# Patient Record
Sex: FEMALE | Race: WHITE | NOT HISPANIC OR LATINO | Employment: OTHER | URBAN - METROPOLITAN AREA
[De-identification: names, ages, dates, MRNs, and addresses within clinical notes are randomized per-mention and may not be internally consistent; named-entity substitution may affect disease eponyms.]

---

## 2017-08-17 ENCOUNTER — HISTORICAL (OUTPATIENT)
Dept: SURGERY | Facility: HOSPITAL | Age: 82
End: 2017-08-17

## 2017-08-21 ENCOUNTER — HISTORICAL (OUTPATIENT)
Dept: ANESTHESIOLOGY | Facility: HOSPITAL | Age: 82
End: 2017-08-21

## 2017-10-02 ENCOUNTER — HISTORICAL (OUTPATIENT)
Dept: RADIOLOGY | Facility: HOSPITAL | Age: 82
End: 2017-10-02

## 2017-10-04 ENCOUNTER — HISTORICAL (OUTPATIENT)
Dept: RADIOLOGY | Facility: HOSPITAL | Age: 82
End: 2017-10-04

## 2017-12-04 ENCOUNTER — HISTORICAL (OUTPATIENT)
Dept: ANESTHESIOLOGY | Facility: HOSPITAL | Age: 82
End: 2017-12-04

## 2018-07-27 ENCOUNTER — HISTORICAL (OUTPATIENT)
Dept: RADIOLOGY | Facility: HOSPITAL | Age: 83
End: 2018-07-27

## 2019-10-03 ENCOUNTER — HISTORICAL (OUTPATIENT)
Dept: RADIOLOGY | Facility: HOSPITAL | Age: 84
End: 2019-10-03

## 2019-10-03 ENCOUNTER — HISTORICAL (OUTPATIENT)
Dept: LAB | Facility: HOSPITAL | Age: 84
End: 2019-10-03

## 2019-10-03 LAB
ABS NEUT (OLG): 3.7 X10(3)/MCL (ref 1.5–6.9)
ALBUMIN SERPL-MCNC: 3.7 GM/DL (ref 3.4–5)
ALBUMIN/GLOB SERPL: 0.9 RATIO
ALP SERPL-CCNC: 52 UNIT/L (ref 30–113)
ALT SERPL-CCNC: 16 UNIT/L (ref 10–45)
AST SERPL-CCNC: 17 UNIT/L (ref 15–37)
BASOPHILS # BLD AUTO: 0.1 X10(3)/MCL (ref 0–0.1)
BASOPHILS NFR BLD AUTO: 1 % (ref 0–1)
BILIRUB SERPL-MCNC: 0.4 MG/DL (ref 0.1–0.9)
BILIRUBIN DIRECT+TOT PNL SERPL-MCNC: 0.1 MG/DL (ref 0–0.3)
BILIRUBIN DIRECT+TOT PNL SERPL-MCNC: 0.3 MG/DL
BUN SERPL-MCNC: 18 MG/DL (ref 10–20)
CALCIUM SERPL-MCNC: 9.5 MG/DL (ref 8–10.5)
CHLORIDE SERPL-SCNC: 102 MMOL/L (ref 100–108)
CHOLEST SERPL-MCNC: 211 MG/DL (ref 140–200)
CHOLEST/HDLC SERPL: 3 MG/DL (ref 0–8)
CO2 SERPL-SCNC: 32 MMOL/L (ref 21–35)
CREAT SERPL-MCNC: 0.77 MG/DL (ref 0.7–1.3)
EOSINOPHIL # BLD AUTO: 0.2 X10(3)/MCL (ref 0–0.6)
EOSINOPHIL NFR BLD AUTO: 3 % (ref 0–5)
ERYTHROCYTE [DISTWIDTH] IN BLOOD BY AUTOMATED COUNT: 14.3 % (ref 11.5–17)
GLOBULIN SER-MCNC: 3.9 GM/DL
GLUCOSE SERPL-MCNC: 108 MG/DL (ref 75–116)
HCT VFR BLD AUTO: 43.8 % (ref 36–48)
HDLC SERPL-MCNC: 64 MG/DL (ref 35–59)
HGB BLD-MCNC: 14 GM/DL (ref 12–16)
IMM GRANULOCYTES # BLD AUTO: 0.01 10*3/UL (ref 0–0.02)
IMM GRANULOCYTES NFR BLD AUTO: 0.1 % (ref 0–0.43)
LDLC SERPL CALC-MCNC: 126 MG/DL (ref 100–129)
LYMPHOCYTES # BLD AUTO: 2.2 X10(3)/MCL (ref 0.5–4.1)
LYMPHOCYTES NFR BLD AUTO: 31 % (ref 15–40)
MCH RBC QN AUTO: 29 PG (ref 27–34)
MCHC RBC AUTO-ENTMCNC: 32 GM/DL (ref 31–36)
MCV RBC AUTO: 91 FL (ref 80–99)
MONOCYTES # BLD AUTO: 0.8 X10(3)/MCL (ref 0–1.1)
MONOCYTES NFR BLD AUTO: 12 % (ref 4–12)
NEUTROPHILS # BLD AUTO: 3.7 X10(3)/MCL (ref 1.5–6.9)
NEUTROPHILS NFR BLD AUTO: 53 % (ref 43–75)
PLATELET # BLD AUTO: 222 X10(3)/MCL (ref 140–400)
PMV BLD AUTO: 10 FL (ref 6.8–10)
POTASSIUM SERPL-SCNC: 3.9 MMOL/L (ref 3.6–5.2)
PROT SERPL-MCNC: 7.6 GM/DL (ref 6.4–8.2)
RBC # BLD AUTO: 4.82 X10(6)/MCL (ref 4.2–5.4)
SODIUM SERPL-SCNC: 139 MMOL/L (ref 135–145)
TRIGL SERPL-MCNC: 101 MG/DL (ref 35–150)
TSH SERPL-ACNC: 1.51 MIU/ML (ref 0.36–3.74)
VLDLC SERPL CALC-MCNC: 20 MG/DL
WBC # SPEC AUTO: 7 X10(3)/MCL (ref 4.5–11.5)

## 2020-03-12 ENCOUNTER — HOSPITAL ENCOUNTER (OUTPATIENT)
Dept: MEDSURG UNIT | Facility: HOSPITAL | Age: 85
End: 2020-03-13
Attending: FAMILY MEDICINE | Admitting: FAMILY MEDICINE

## 2020-06-13 ENCOUNTER — HISTORICAL (OUTPATIENT)
Dept: RADIOLOGY | Facility: HOSPITAL | Age: 85
End: 2020-06-13

## 2021-02-10 ENCOUNTER — HISTORICAL (OUTPATIENT)
Dept: RADIOLOGY | Facility: HOSPITAL | Age: 86
End: 2021-02-10

## 2022-02-11 ENCOUNTER — HISTORICAL (OUTPATIENT)
Dept: RADIOLOGY | Facility: HOSPITAL | Age: 87
End: 2022-02-11

## 2022-02-11 ENCOUNTER — HISTORICAL (OUTPATIENT)
Dept: ADMINISTRATIVE | Facility: HOSPITAL | Age: 87
End: 2022-02-11

## 2022-04-29 NOTE — DISCHARGE SUMMARY
REASONS FOR ADMISSION:  The patient was admitted for diverticulitis, hypertension, weakness, family dynamics problems and anxiety.    DISCHARGE DIAGNOSES:  Diverticulitis, hypertension, weakness, family dynamics problems and anxiety.    HISTORY AND HOSPITAL COURSE:  She does have a strong history of diverticulitis, for which she has been admitted before.  She had presented to the emergency room earlier in the morning and was sent home on Cipro and Flagyl.  She was supposed to be going to [place] with her daughter to move there.  She __________ a lot of pain.  She also was having some anxiety.  They placed her under observation status overnight.  By the time I saw her, her pain was much better.  She is on Cipro and Flagyl IV.  She was having very little pain.  Her vital signs were stable.  Her daughter was there.  They want to move her to [place] for better care.  The patient wants to stay at home here in the [facility] area.  The patient and family are aware that she is anxious about this news.  Her left lower quadrant __________ and it was decided that she was stable for discharge to go home on Cipro and Flagyl orally with a pain script for Norco.  The patient is to return back to clinic in 1-2 weeks, but the patient is going to be going with her daughter to the [place] area and she may find followup there.        FREDIS/EMMETT   DD: 03/14/2020 0618   DT: 03/14/2020 0643  Job # [id number]

## 2022-04-29 NOTE — ED PROVIDER NOTES
"   Patient:   Adeline Squires            MRN: [id number]            FIN: [id number]               Age:   88 years     Sex:  Female     :  10/11/1931   Associated Diagnoses:   Acute weakness; Diverticulitis; Family dynamics problem   Author:   Ronnie Shankar MD      Basic Information   Time seen: Date & time 3/13/2020 23:46:00.   History source: Patient, daughter, Candida.   Arrival mode: Ambulance.   History limitation: None.   Additional information: Patient's physician(s): Pawan BONILLA, Patricia Guerrero, PCP, Chief Complaint from Nursing Triage Note : Chief Complaint   3/12/2020 23:35 CDT      Chief Complaint           Acute onset of weakness.  Pt upset because "I feel like I'm such a bother to my family because I can't take care of my self anymore".    3/12/2020 8:31 CDT       Chief Complaint           abd pain x2 days after eating at [employer],  pt reports Hx of diverticulitis,  deneis fever or NVD,  last BM tuesday  .   Provider/Visit info:   Time Seen:  Ronnie Shankar MD / 2020 23:46  .   History of Present Illness   The patient presents with weakness.  The onset was just prior to arrival.  The course/duration of symptoms is improving.  The character of symptoms is generalized.  The degree at present is none.  Risk factors consist of hypertension, age and HLD .  Prior episodes: none.  Therapy today: see nurses notes.  Associated symptoms: nausea, denies chest pain, denies abdominal pain, denies vomiting, denies shortness of breath, denies fever, denies chills, denies headache, denies dizziness and denies back pain.        Review of Systems   Constitutional symptoms:  Weakness, no fever, no chills, no sweats, no fatigue, no decreased activity.    Skin symptoms:  No rash,    Eye symptoms:  No recent vision problems, no blurred vision.    Respiratory symptoms:  No shortness of breath, no cough, no sputum production.    Cardiovascular symptoms:  No chest pain, no tachycardia, no syncope, no peripheral edema.  "   Gastrointestinal symptoms:  Nausea, no abdominal pain, no vomiting.    Musculoskeletal symptoms:  No back pain, no Muscle pain, no Joint pain.    Neurologic symptoms:  No headache, no dizziness, no altered level of consciousness, no numbness, no tingling, no weakness.    Psychiatric symptoms:  Anxiety.             Additional review of systems information: All other systems reviewed and otherwise negative.      Health Status   Allergies:    Allergic Reactions (Selected)  No Known Allergies,    Allergies (1) Active Reaction  No Known Allergies None Documented  .   Medications: Per nurse's notes.      Past Medical/ Family/ Social History   Medical history:    Active  Hypertension (67066936)  H/O: dementia ([id number])  Diverticulitis ([id number])  Hypothyroidism (68860406), Reviewed as documented in chart.   Surgical history:    Cataract Extract Phacoemulsification/IOL (Right) on 12/4/2017 at 86 Years.  Comments:  12/4/2017 7:46 Kristen Claudio RN  auto-populated from documented surgical case  Cataract Extract Phacoemulsification/IOL (Left) on 8/21/2017 at 85 Years.  Comments:  8/21/2017 11:30 Kristen Hunter RN  auto-populated from documented surgical case  Tonsillectomy and adenoidectomy ([id number]).  Breast biopsy and related procedures ([id number]).  Colonoscopy ([id number])., Reviewed as documented in chart.   Family history:    Congestive heart disease.  Father  Mother  , Reviewed as documented in chart.   Social history:    Social & Psychosocial Habits    Alcohol  08/17/2017  Use: Never    Employment/School  08/17/2017  Status: Retired    Home/Environment  08/17/2017  Lives with: Alone    Substance Use  08/17/2017  Use: Never    Tobacco  06/11/2019  Use: Never (less than 100 in l    Patient Wants Consult For Cessation Counseling No    03/12/2020  Use: Never (less than 100 in l    Patient Wants Consult For Cessation Counseling No    Abuse/Neglect  06/11/2019  SHX Any signs of abuse or neglect No    Feels  unsafe at home: No    Safe place to go: Yes    03/12/2020  SHX Any signs of abuse or neglect No  , Reviewed as documented in chart.   Problem list:    Active Problems (6)  Diverticulitis   H/O: dementia   Hypertension   Hypothyroidism   Insomnia   Thyroid dysfunction   .      Physical Examination               Vital Signs   Vital Signs   3/12/2020 23:35 CDT      Temperature Oral          37.7 DegC                             Temperature Oral (calculated)             99.86 DegF                             Peripheral Pulse Rate     63 bpm                             Respiratory Rate          24 br/min                             SpO2                      98 %                             Oxygen Therapy            Room air                             Systolic Blood Pressure   162 mmHg  HI                             Diastolic Blood Pressure  98 mmHg  HI  .   General:  Alert, no acute distress.    Skin:  Normal for ethnicity.   Head:  Normocephalic.   Neck:  Supple, trachea midline, no tenderness.    Eye:  Normal conjunctiva.   Ears, nose, mouth and throat:  Oral mucosa moist.   Cardiovascular:  Regular rate and rhythm, Normal peripheral perfusion.    Respiratory:  Lungs are clear to auscultation, respirations are non-labored, breath sounds are equal.    Gastrointestinal:  Soft, Nontender, Non distended, Normal bowel sounds.    Musculoskeletal:  Normal ROM, normal strength, no tenderness, no swelling, no deformity.    Neurological:  Alert and oriented to person, place, time, and situation, No focal neurological deficit observed, normal sensory observed, normal motor observed, normal speech observed, normal coordination observed.    Psychiatric:  Cooperative.      Medical Decision Making   Documents reviewed:  Emergency department nurses' notes, emergency department records, prior records.    Results review:  Lab results : Lab View   3/13/2020 0:50 CDT       UA Appear                 Clear                             UA  Color                  Yellow                             UA Spec Grav              1.020  NA                             UA Bili                   Negative                             UA pH                     7.5  NA                             UA Urobilinogen           0.2 EU/dL                             UA Blood                  Trace-Intact                             UA Glucose                Negative                             UA Ketones                15 mg/dL                             UA Protein                Negative                             UA Nitrite                Negative                             UA Leuk Est               Negative                             UA WBC                    None Seen                             UA RBC                    Rare /HPF                             UA Bacteria               None Seen /HPF                             UA Squam Epithelial       Rare /LPF    3/13/2020 0:48 CDT       Sodium Lvl                138 mmol/L                             Potassium Lvl             3.9 mmol/L                             Chloride                  102 mmol/L                             CO2                       32 mmol/L                             Calcium Lvl               9.2 mg/dL                             Glucose Lvl               142 mg/dL  HI                             BUN                       12 mg/dL                             Creatinine                0.76 mg/dL                             eGFR-AA                   >60 mL/min/1.73 m2  NA                             eGFR-PRIYA                  >60 mL/min/1.73 m2  NA                             Bili Total                0.5 mg/dL                             Bili Direct               0.10 mg/dL                             Bili Indirect             0.40 mg/dL  NA                             AST                       15 unit/L                             ALT                       18 unit/L                              Alk Phos                  51 unit/L                             Total Protein             7.7 gm/dL                             Albumin Lvl               4.1 gm/dL                             Globulin                  3.60 gm/dL  NA                             A/G Ratio                 1.1 ratio  NA                             Total CK                  49 unit/L                             CK MB                     0.9 ng/mL                             Troponin-I                <0.017 ng/mL                             WBC                       6.2 x10(3)/mcL                             RBC                       4.73 x10(6)/mcL                             Hgb                       13.8 gm/dL                             Hct                       42.7 %                             Platelet                  214 x10(3)/mcL                             MCV                       90 fL                             MCH                       29 pg                             MCHC                      32 gm/dL                             RDW                       13.9 %                             MPV                       10.1 fL  HI                             Abs Neut                  4.2 x10(3)/mcL                             Neutro Auto               67 %                             Lymph Auto                21 %                             Mono Auto                 11 %                             Eos Auto                  0 %                             Abs Eos                   0.0 x10(3)/mcL                             Basophil Auto             1 %                             Abs Neutro                4.2 x10(3)/mcL                             Abs Lymph                 1.3 x10(3)/mcL                             Abs Mono                  0.7 x10(3)/mcL                             Abs Baso                  0.0 x10(3)/mcL                             IG%                       0.200 %                             IG#                        0.0100  , Interpretation Normal results.    Notes:  Pt presents with acute weakness. pt seen earlier in ED and diagnosed with diverticulitis. pt also having family issues regarding her liivng situation and is worried about that.  Discussed case with pcp on call service- recommend admission.  .      Reexamination/ Reevaluation   Time: 3/13/2020 01:40:00 .   Vital signs   results included from flowsheet : Vital Signs   3/13/2020 2:23 CDT       Temperature Oral          36.4 DegC                             Temperature Oral (calculated)             97.52 DegF                             Peripheral Pulse Rate     58 bpm  LOW                             SpO2                      95 %                             Systolic Blood Pressure   151 mmHg  HI                             Diastolic Blood Pressure  69 mmHg                             Mean Arterial Pressure, Cuff              96 mmHg    3/13/2020 1:45 CDT       Oxygen Therapy            Room air    3/13/2020 1:38 CDT       Peripheral Pulse Rate     72 bpm                             Respiratory Rate          14 br/min                             SpO2                      98 %                             Systolic Blood Pressure   149 mmHg  HI                             Diastolic Blood Pressure  69 mmHg    3/12/2020 23:45 CDT      Oxygen Therapy            Room air    3/12/2020 23:35 CDT      Temperature Oral          37.7 DegC                             Temperature Oral (calculated)             99.86 DegF                             Peripheral Pulse Rate     63 bpm                             Respiratory Rate          24 br/min                             SpO2                      98 %                             Oxygen Therapy            Room air                             Systolic Blood Pressure   162 mmHg  HI                             Diastolic Blood Pressure  98 mmHg  HI     Course: improving.   Pain status: decreased.   Assessment: Discussed  case with pcp on call service- recommend admisison.      .      Impression and Plan   Diagnosis   Acute weakness (MMF46-GT R53.1)   Diverticulitis (IIH69-JS K57.92)   Family dynamics problem (WEZ21-TO Z63.9)      Calls-Consults   -  3/13/2020 01:29:00 , Pawan BONILLA, Patricia Guerrero, PCP- On call service, recommends Spoke with Dr. Wolf-recommend admission.  .    Plan   Condition: Stable.    Disposition: Place in Observation Telemetry Unit.    Counseled: Patient, Family, Regarding diagnosis, Regarding diagnostic results, Regarding treatment plan, Patient indicated understanding of instructions, Daughter verbalized understanding .

## 2022-04-29 NOTE — OP NOTE
PREOPERATIVE DIAGNOSIS:  Nuclear sclerotic cataract, right eye.    POSTOPERATIVE DIAGNOSIS:  Nuclear sclerotic cataract, right eye.    OPERATIVE PROCEDURE:  Phacoemulsification of cataract, right eye, with insertion of intraocular lens (59357).    PROCEDURE IN DETAIL:  After premedication, the patient was taken to the operating room under local anesthesia and prepped and draped in a sterile manner.  A Barraquer lid speculum was placed in the eye and a 3.2 clear corneal groove was performed at the temporal cornea.  A micropoint blade was then used to perform a keratotomy at the 1:00 hour to the right of the groove.  The anterior chamber was filled with viscoelastic and then a 2.5 millimeter keratome was introduced into the cornea through the previously made groove.  A previously bent 30 gauge needle was used to perform a continuous tear capsulotomy and the nucleus was then hydrodissected.  Emulsification of the nucleus was carried out with a fracturing technique and then the cortex removed with the I-A hand piece.  The posterior capsule was polished with the posterior Simcoe needle and then the capsular bag and anterior chamber refilled with viscoelastic.  The corneal wound was enlarged to 3.2 millimeters and then a foldable lens was placed in the capsular bag and dialed into position.  The viscoelastic was then removed from the anterior and posterior chambers with the I-A handpiece and the anterior chamber was reinflated with balanced salt solution.  The wounds were tested and found to be secure.  The patient tolerated the procedure well and was taken back to the outpatient unit in good condition.        THC/EMMETT   DD: 12/04/2017 0748   DT: 12/04/2017 0815  Job # [id number]

## 2022-04-29 NOTE — OP NOTE
PREOPERATIVE DIAGNOSIS:  Nuclear sclerotic cataract, left eye.    POSTOPERATIVE DIAGNOSIS: Nuclear sclerotic cataract left eye    PROCEDURE:  Phacoemulsification of cataract of left eye with insertion of intraocular lens.    PROCEDURE IN DETAIL:  After premedication, the patient was taken to the operating room under local anesthesia and prepped and draped in a sterile manner.  A Barraquer lid speculum was placed in the eye and a 3.2 clear corneal groove was performed at the temporal cornea.  A micropoint blade was then used to perform a keratotomy at the 1:00 hour to the right of the groove.  The anterior chamber was filled with viscoelastic and then a 2.5 millimeter keratome was introduced into the cornea through the previously made groove.  A previously bent 30 gauge needle was used to perform a continuous tear capsulotomy and the nucleus was then hydrodissected.  Emulsification of the nucleus was carried out with a fracturing technique and then the cortex removed with the I-A hand piece.  The posterior capsule was polished with the posterior Simcoe needle and then the capsular bag and anterior chamber refilled with viscoelastic.  The corneal wound was enlarged to 3.2 millimeters and then a foldable lens was placed in the capsular bag and dialed into position.  The viscoelastic was then removed from the anterior and posterior chambers with the I-A handpiece and the anterior chamber was reinflated with balanced salt solution.  The wounds were tested and found to be secure.  The patient tolerated the procedure well and was taken back to the outpatient unit in good condition.        THC/EMMETT   DD: 09/13/2017 0914   DT: 09/13/2017 0924  Job # [id number]

## 2022-05-17 DIAGNOSIS — I82.592 CHRONIC EMBOLISM AND THROMBOSIS OF OTHER SPECIFIED DEEP VEIN OF LEFT LOWER EXTREMITY: ICD-10-CM

## 2022-05-17 DIAGNOSIS — R09.89 OTHER SPECIFIED SYMPTOMS AND SIGNS INVOLVING THE CIRCULATORY AND RESPIRATORY SYSTEMS: Primary | ICD-10-CM

## 2022-05-17 DIAGNOSIS — I82.402 DEEP VEIN THROMBOSIS OF LEFT LOWER LIMB: ICD-10-CM

## 2022-05-18 ENCOUNTER — HOSPITAL ENCOUNTER (OUTPATIENT)
Dept: RADIOLOGY | Facility: HOSPITAL | Age: 87
Discharge: HOME | End: 2022-05-18
Attending: FAMILY MEDICINE
Payer: MEDICARE

## 2022-05-18 DIAGNOSIS — R09.89 OTHER SPECIFIED SYMPTOMS AND SIGNS INVOLVING THE CIRCULATORY AND RESPIRATORY SYSTEMS: ICD-10-CM

## 2022-05-18 DIAGNOSIS — I82.402 DEEP VEIN THROMBOSIS OF LEFT LOWER LIMB: ICD-10-CM

## 2022-05-18 DIAGNOSIS — I82.592 CHRONIC EMBOLISM AND THROMBOSIS OF OTHER SPECIFIED DEEP VEIN OF LEFT LOWER EXTREMITY: ICD-10-CM

## 2022-05-18 PROCEDURE — 93971 EXTREMITY STUDY: CPT | Mod: TC,LT

## 2022-05-18 PROCEDURE — 93926 LOWER EXTREMITY STUDY: CPT | Mod: TC,LT

## 2022-08-13 ENCOUNTER — HOSPITAL ENCOUNTER (EMERGENCY)
Facility: HOSPITAL | Age: 87
Discharge: HOME | End: 2022-08-13
Attending: INTERNAL MEDICINE
Payer: MEDICARE

## 2022-08-13 VITALS
BODY MASS INDEX: 32.2 KG/M2 | RESPIRATION RATE: 18 BRPM | DIASTOLIC BLOOD PRESSURE: 86 MMHG | TEMPERATURE: 99 F | WEIGHT: 164 LBS | OXYGEN SATURATION: 97 % | HEIGHT: 60 IN | SYSTOLIC BLOOD PRESSURE: 151 MMHG | HEART RATE: 89 BPM

## 2022-08-13 DIAGNOSIS — N39.0 URINARY TRACT INFECTION WITH HEMATURIA, SITE UNSPECIFIED: Primary | ICD-10-CM

## 2022-08-13 DIAGNOSIS — R31.9 URINARY TRACT INFECTION WITH HEMATURIA, SITE UNSPECIFIED: Primary | ICD-10-CM

## 2022-08-13 LAB
ALBUMIN SERPL-MCNC: 3.7 GM/DL (ref 3.4–4.8)
ALBUMIN/GLOB SERPL: 1.1 RATIO (ref 1.1–2)
ALP SERPL-CCNC: 39 UNIT/L (ref 40–150)
ALT SERPL-CCNC: 25 UNIT/L (ref 0–55)
APPEARANCE UR: CLEAR
AST SERPL-CCNC: 30 UNIT/L (ref 5–34)
BACTERIA #/AREA URNS AUTO: ABNORMAL /HPF
BASOPHILS # BLD AUTO: 0.07 X10(3)/MCL (ref 0–0.2)
BASOPHILS NFR BLD AUTO: 0.8 %
BILIRUB UR QL STRIP.AUTO: NEGATIVE MG/DL
BILIRUBIN DIRECT+TOT PNL SERPL-MCNC: 0.3 MG/DL
BUN SERPL-MCNC: 19 MG/DL (ref 9.8–20.1)
CALCIUM SERPL-MCNC: 9.9 MG/DL (ref 8.4–10.2)
CHLORIDE SERPL-SCNC: 99 MMOL/L (ref 98–111)
CO2 SERPL-SCNC: 28 MMOL/L (ref 23–31)
COLOR UR AUTO: YELLOW
CREAT SERPL-MCNC: 0.77 MG/DL (ref 0.55–1.02)
EOSINOPHIL # BLD AUTO: 0.3 X10(3)/MCL (ref 0–0.9)
EOSINOPHIL NFR BLD AUTO: 3.3 %
ERYTHROCYTE [DISTWIDTH] IN BLOOD BY AUTOMATED COUNT: 14.4 % (ref 11.5–17)
ERYTHROCYTE [SEDIMENTATION RATE] IN BLOOD: 25 MM/HR (ref 0–20)
GFR SERPLBLD CREATININE-BSD FMLA CKD-EPI: >60 MLS/MIN/1.73/M2
GLOBULIN SER-MCNC: 3.4 GM/DL (ref 2.4–3.5)
GLUCOSE SERPL-MCNC: 144 MG/DL (ref 75–121)
GLUCOSE UR QL STRIP.AUTO: 100 MG/DL
HCT VFR BLD AUTO: 40.8 % (ref 37–47)
HGB BLD-MCNC: 13.2 GM/DL (ref 12–16)
IMM GRANULOCYTES # BLD AUTO: 0.04 X10(3)/MCL (ref 0–0.04)
IMM GRANULOCYTES NFR BLD AUTO: 0.4 %
KETONES UR QL STRIP.AUTO: NEGATIVE MG/DL
LEUKOCYTE ESTERASE UR QL STRIP.AUTO: ABNORMAL UNIT/L
LYMPHOCYTES # BLD AUTO: 2.58 X10(3)/MCL (ref 0.6–4.6)
LYMPHOCYTES NFR BLD AUTO: 28.5 %
MCH RBC QN AUTO: 28.4 PG (ref 27–31)
MCHC RBC AUTO-ENTMCNC: 32.4 MG/DL (ref 33–36)
MCV RBC AUTO: 87.9 FL (ref 80–94)
MONOCYTES # BLD AUTO: 1.43 X10(3)/MCL (ref 0.1–1.3)
MONOCYTES NFR BLD AUTO: 15.8 %
NEUTROPHILS # BLD AUTO: 4.6 X10(3)/MCL (ref 2.1–9.2)
NEUTROPHILS NFR BLD AUTO: 51.2 %
NITRITE UR QL STRIP.AUTO: NEGATIVE
PH UR STRIP.AUTO: 6.5 [PH]
PLATELET # BLD AUTO: 237 X10(3)/MCL (ref 130–400)
PMV BLD AUTO: 10.9 FL (ref 7.4–10.4)
POTASSIUM SERPL-SCNC: 3.7 MMOL/L (ref 3.5–5.1)
PROT SERPL-MCNC: 7.1 GM/DL (ref 5.8–7.6)
PROT UR QL STRIP.AUTO: NEGATIVE MG/DL
RBC # BLD AUTO: 4.64 X10(6)/MCL (ref 4.2–5.4)
RBC #/AREA URNS AUTO: ABNORMAL /HPF
RBC UR QL AUTO: ABNORMAL UNIT/L
SODIUM SERPL-SCNC: 139 MMOL/L (ref 132–146)
SP GR UR STRIP.AUTO: 1.02
SQUAMOUS #/AREA URNS AUTO: ABNORMAL /HPF
UROBILINOGEN UR STRIP-ACNC: 0.2 MG/DL
WBC # SPEC AUTO: 9.1 X10(3)/MCL (ref 4.5–11.5)
WBC #/AREA URNS AUTO: ABNORMAL /HPF

## 2022-08-13 PROCEDURE — 36415 COLL VENOUS BLD VENIPUNCTURE: CPT | Performed by: NURSE PRACTITIONER

## 2022-08-13 PROCEDURE — 81001 URINALYSIS AUTO W/SCOPE: CPT | Performed by: NURSE PRACTITIONER

## 2022-08-13 PROCEDURE — 99283 EMERGENCY DEPT VISIT LOW MDM: CPT | Mod: 25

## 2022-08-13 PROCEDURE — 85651 RBC SED RATE NONAUTOMATED: CPT | Performed by: NURSE PRACTITIONER

## 2022-08-13 PROCEDURE — 85025 COMPLETE CBC W/AUTO DIFF WBC: CPT | Performed by: NURSE PRACTITIONER

## 2022-08-13 PROCEDURE — 80053 COMPREHEN METABOLIC PANEL: CPT | Performed by: NURSE PRACTITIONER

## 2022-08-13 PROCEDURE — 25000003 PHARM REV CODE 250: Performed by: NURSE PRACTITIONER

## 2022-08-13 RX ORDER — NITROFURANTOIN 25; 75 MG/1; MG/1
100 CAPSULE ORAL 2 TIMES DAILY
Qty: 10 CAPSULE | Refills: 0 | Status: SHIPPED | OUTPATIENT
Start: 2022-08-13 | End: 2022-08-18

## 2022-08-13 RX ORDER — NITROFURANTOIN 25; 75 MG/1; MG/1
100 CAPSULE ORAL
Status: COMPLETED | OUTPATIENT
Start: 2022-08-13 | End: 2022-08-13

## 2022-08-13 RX ORDER — HYDROCODONE BITARTRATE AND ACETAMINOPHEN 5; 325 MG/1; MG/1
1 TABLET ORAL
Status: COMPLETED | OUTPATIENT
Start: 2022-08-13 | End: 2022-08-13

## 2022-08-13 RX ADMIN — NITROFURANTOIN 100 MG: 25; 75 CAPSULE ORAL at 02:08

## 2022-08-13 RX ADMIN — HYDROCODONE BITARTRATE AND ACETAMINOPHEN 1 TABLET: 5; 325 TABLET ORAL at 01:08

## 2022-08-13 NOTE — ED PROVIDER NOTES
Source of History:  Patient      Chief complaint:  Back Pain (L sided low back pain for 4-5 days     PCP saw her Tuesday   thought it was shingles)      HPI:  Adeline Squires is a 89 y.o. female presenting with left mid back pain. She reports the skin itself is very sensitive. She denies injury. She denies fever or chills. She was seen by her pcp and given a pain medication and a muscle relaxer and it is not helping. Nothing helping at this time, any palpation of the skin makes the pain worse.     This is the extent to the patients complaints today here in the emergency department.    ROS: As per HPI and below:  General: No fever.  No chills.  Eyes: No visual changes.  ENT: No sore throat. No ear pain  Head: No headache.    Chest: No shortness of breath. No cough.  Cardiovascular: No chest pain.  Abdomen: No abdominal pain.  No nausea or vomiting.  Genito-Urinary: No abnormal urination.  Neurologic: No focal weakness.  No numbness.  MSK: No myalgias. No arthralgias. Left mid back pain.   Integument: No rashes or lesions. Pain to skin of left mid back.   Psych: No confusion      Review of patient's allergies indicates:  No Known Allergies    PMH:  As per HPI and below:  No past medical history on file.  No past surgical history on file.         Physical Exam:    BP (!) 169/91   Pulse 96   Temp 98.7 °F (37.1 °C) (Oral)   Resp 20   Ht 5' (1.524 m)   Wt 74.4 kg (164 lb)   SpO2 (!) 94%   BMI 32.03 kg/m²   Nursing note and vital signs reviewed.  Appearance: Afebrile. Not toxic appearing. No acute distress.  Head: Atraumatic  Eyes: No conjunctival injection. No scleral icterus  ENT: Normal phonation  Chest/ Respiratory: No respiratory distress. No accessory muscle use.  Cardiovascular: Regular rate   Abdomen:  Not distended.    Musculoskeletal: Good range of motion all joints.  No deformities.  Neck supple.  No meningismus.  Skin: No rashes seen.  Good turgor.  No ecchymoses. Pain to touch of the skin of the mid  left back. No rash. Light erythema.   Neurologic: GCS 15. Ambulates with a steady gait.   Mental Status:  Alert and oriented x 3.  Appropriate, conversant    Labs that have been ordered have been independently reviewed and interpreted by myself.    I decided to obtain the patient's medical records.  Summary of Medical Records:  Nursing documentation    Initial Impression/ Differential Dx:  Muscle spasms, shingles    MDM:    89 y.o. female with pain to the left mid back. On exam her sensitivity to light touch of the skin makes me feel like this is a shingles infection but it has been 4 days and no rash is present. We will check labs and urine today to investigate her c/o of back pain then develop plan of care once these results have been obtained.                    Diagnostic Impression:    1. Urinary tract infection with hematuria, site unspecified         ED Disposition Condition    Discharge Stable          ED Prescriptions     Medication Sig Dispense Start Date End Date Auth. Provider    nitrofurantoin, macrocrystal-monohydrate, (MACROBID) 100 MG capsule Take 1 capsule (100 mg total) by mouth 2 (two) times daily. for 5 days 10 capsule 8/13/2022 8/18/2022 SUKH Bello        Follow-up Information     Follow up With Specialties Details Why Contact Info    Patricia Villalta MD Family Medicine Call in 1 week As needed, For ER Follow Up. [address].  [facility]  [facility] [address]  [phone]             SUKH Bello  08/13/22 3950

## 2022-10-01 ENCOUNTER — HOSPITAL ENCOUNTER (EMERGENCY)
Facility: HOSPITAL | Age: 87
Discharge: HOME | End: 2022-10-01
Attending: EMERGENCY MEDICINE
Payer: MEDICARE

## 2022-10-01 VITALS
OXYGEN SATURATION: 93 % | WEIGHT: 166 LBS | DIASTOLIC BLOOD PRESSURE: 98 MMHG | BODY MASS INDEX: 31.34 KG/M2 | HEART RATE: 87 BPM | RESPIRATION RATE: 18 BRPM | HEIGHT: 61 IN | TEMPERATURE: 98 F | SYSTOLIC BLOOD PRESSURE: 177 MMHG

## 2022-10-01 DIAGNOSIS — N39.0 ACUTE UTI: ICD-10-CM

## 2022-10-01 DIAGNOSIS — M54.50 ACUTE RIGHT-SIDED LOW BACK PAIN WITHOUT SCIATICA: Primary | ICD-10-CM

## 2022-10-01 LAB
APPEARANCE UR: CLEAR
BACTERIA #/AREA URNS AUTO: ABNORMAL /HPF
BILIRUB UR QL STRIP.AUTO: NEGATIVE MG/DL
COLOR UR AUTO: YELLOW
GLUCOSE UR QL STRIP.AUTO: NEGATIVE MG/DL
KETONES UR QL STRIP.AUTO: NEGATIVE MG/DL
LEUKOCYTE ESTERASE UR QL STRIP.AUTO: ABNORMAL UNIT/L
NITRITE UR QL STRIP.AUTO: NEGATIVE
PH UR STRIP.AUTO: 5.5 [PH]
PROT UR QL STRIP.AUTO: NEGATIVE MG/DL
RBC #/AREA URNS AUTO: ABNORMAL /HPF
RBC UR QL AUTO: ABNORMAL UNIT/L
SP GR UR STRIP.AUTO: 1.02
SQUAMOUS #/AREA URNS AUTO: ABNORMAL /HPF
UROBILINOGEN UR STRIP-ACNC: 0.2 MG/DL
WBC #/AREA URNS AUTO: ABNORMAL /HPF

## 2022-10-01 PROCEDURE — 25000003 PHARM REV CODE 250: Performed by: EMERGENCY MEDICINE

## 2022-10-01 PROCEDURE — 87186 SC STD MICRODIL/AGAR DIL: CPT | Performed by: EMERGENCY MEDICINE

## 2022-10-01 PROCEDURE — 99283 EMERGENCY DEPT VISIT LOW MDM: CPT | Mod: 25

## 2022-10-01 PROCEDURE — 81001 URINALYSIS AUTO W/SCOPE: CPT | Performed by: EMERGENCY MEDICINE

## 2022-10-01 RX ORDER — CEFDINIR 300 MG/1
300 CAPSULE ORAL
Status: COMPLETED | OUTPATIENT
Start: 2022-10-01 | End: 2022-10-01

## 2022-10-01 RX ORDER — CEFDINIR 300 MG/1
300 CAPSULE ORAL 2 TIMES DAILY
Qty: 20 CAPSULE | Refills: 0 | Status: SHIPPED | OUTPATIENT
Start: 2022-10-01 | End: 2022-10-11

## 2022-10-01 RX ADMIN — CEFDINIR 300 MG: 300 CAPSULE ORAL at 05:10

## 2022-10-01 NOTE — ED PROVIDER NOTES
Encounter Date: 10/1/2022       History     Chief Complaint   Patient presents with    Back Pain     Pt c/o rt sided lower back pain starting today. Pt states dhe was here a month ago with similar symptoms and was tx for UTI. Denies dysuria.     The history is provided by the patient. No  was used.   Back Pain   This is a new problem. The current episode started today. The problem occurs intermittently. The problem has been waxing and waning. The pain is associated with no known injury. The pain is present in the lumbar spine. The quality of the pain is described as stabbing. The pain does not radiate. Pertinent negatives include no chest pain, no fever, no perianal numbness, no bladder incontinence, no dysuria, no pelvic pain, no leg pain, no paresthesias, no paresis, no tingling and no weakness. She has tried nothing for the symptoms.   Was seen in August for similar symptoms and treated for UTI - symptoms resolved.    Review of patient's allergies indicates:  No Known Allergies  Past Medical History:   Diagnosis Date    Dementia     Diverticulosis of colon     HH (hiatus hernia)     Hypertension     Hypothyroidism     Nephrolithiasis      Past Surgical History:   Procedure Laterality Date    BREAST BIOPSY      CATARACT EXTRACTION      TONSILLECTOMY       Family History   Problem Relation Age of Onset    Heart disease Mother     Heart disease Father      Social History     Tobacco Use    Smoking status: Never    Smokeless tobacco: Never   Substance Use Topics    Alcohol use: Never    Drug use: Never     Review of Systems   Constitutional:  Negative for fever.   HENT:  Negative for sore throat.    Respiratory:  Negative for shortness of breath.    Cardiovascular:  Negative for chest pain.   Gastrointestinal:  Negative for nausea.   Genitourinary:  Negative for bladder incontinence, dysuria and pelvic pain.   Musculoskeletal:  Positive for back pain.   Skin:  Negative for rash.   Neurological:   Negative for tingling, weakness and paresthesias.   Hematological:  Does not bruise/bleed easily.     Physical Exam     Initial Vitals [10/01/22 1705]   BP Pulse Resp Temp SpO2   (!) 177/98 87 18 97.9 °F (36.6 °C) (!) 93 %      MAP       --         Physical Exam    Nursing note and vitals reviewed.  Constitutional: She appears well-developed and well-nourished.   HENT:   Head: Normocephalic and atraumatic.   Right Ear: External ear normal.   Left Ear: External ear normal.   Eyes: Conjunctivae and EOM are normal. Pupils are equal, round, and reactive to light.   Neck: Neck supple.   Normal range of motion.  Cardiovascular:  Normal rate, regular rhythm, normal heart sounds and intact distal pulses.           Pulmonary/Chest: Breath sounds normal.   Abdominal: Abdomen is soft. Bowel sounds are normal.   Musculoskeletal:         General: Normal range of motion.      Cervical back: Normal range of motion and neck supple.        Back:      Neurological: She is alert and oriented to person, place, and time. GCS score is 15. GCS eye subscore is 4. GCS verbal subscore is 5. GCS motor subscore is 6.   Skin: Skin is warm and dry. Capillary refill takes less than 2 seconds.   Psychiatric: She has a normal mood and affect. Her behavior is normal. Judgment and thought content normal.       ED Course   Procedures  Labs Reviewed   URINALYSIS, REFLEX TO URINE CULTURE - Abnormal; Notable for the following components:       Result Value    Blood, UA Trace-Intact (*)     Leukocyte Esterase, UA Small (*)     All other components within normal limits   URINALYSIS, MICROSCOPIC - Abnormal; Notable for the following components:    Bacteria, UA Many (*)     WBC, UA 50-99 (*)     Squamous Epithelial Cells, UA Moderate (*)     All other components within normal limits   CULTURE, URINE          Imaging Results    None          Medications   cefdinir capsule 300 mg (has no administration in time range)                              Clinical  Impression:   Final diagnoses:  [M54.50] Acute right-sided low back pain without sciatica (Primary)  [N39.0] Acute UTI      ED Disposition Condition    Discharge Stable          ED Prescriptions       Medication Sig Dispense Start Date End Date Auth. Provider    cefdinir (OMNICEF) 300 MG capsule Take 1 capsule (300 mg total) by mouth 2 (two) times daily. for 10 days 20 capsule 10/1/2022 10/11/2022 Dane Cerda MD          Follow-up Information       Follow up With Specialties Details Why Contact Info    Follow up with your primary MD in 3-5 days if not improved.  Return to ED for worsening symptoms.                 Dane Cerda MD  10/01/22 [id number]

## 2022-10-04 LAB — BACTERIA UR CULT: ABNORMAL

## 2022-11-05 ENCOUNTER — HOSPITAL ENCOUNTER (EMERGENCY)
Facility: HOSPITAL | Age: 87
Discharge: HOME | End: 2022-11-05
Attending: STUDENT IN AN ORGANIZED HEALTH CARE EDUCATION/TRAINING PROGRAM
Payer: MEDICARE

## 2022-11-05 VITALS
DIASTOLIC BLOOD PRESSURE: 78 MMHG | WEIGHT: 160 LBS | HEART RATE: 76 BPM | TEMPERATURE: 97 F | BODY MASS INDEX: 30.23 KG/M2 | SYSTOLIC BLOOD PRESSURE: 154 MMHG | OXYGEN SATURATION: 93 % | RESPIRATION RATE: 18 BRPM

## 2022-11-05 DIAGNOSIS — B02.9 HERPES ZOSTER WITHOUT COMPLICATION: Primary | ICD-10-CM

## 2022-11-05 LAB
ALBUMIN SERPL-MCNC: 3.7 GM/DL (ref 3.4–4.8)
ALBUMIN/GLOB SERPL: 1.1 RATIO (ref 1.1–2)
ALP SERPL-CCNC: 39 UNIT/L (ref 40–150)
ALT SERPL-CCNC: 21 UNIT/L (ref 0–55)
APPEARANCE UR: ABNORMAL
AST SERPL-CCNC: 22 UNIT/L (ref 5–34)
BACTERIA #/AREA URNS AUTO: ABNORMAL /HPF
BASOPHILS # BLD AUTO: 0.1 X10(3)/MCL (ref 0–0.2)
BASOPHILS NFR BLD AUTO: 1.1 %
BILIRUB UR QL STRIP.AUTO: NEGATIVE MG/DL
BILIRUBIN DIRECT+TOT PNL SERPL-MCNC: 0.3 MG/DL
BUN SERPL-MCNC: 20 MG/DL (ref 9.8–20.1)
CALCIUM SERPL-MCNC: 10 MG/DL (ref 8.4–10.2)
CHLORIDE SERPL-SCNC: 101 MMOL/L (ref 98–111)
CO2 SERPL-SCNC: 26 MMOL/L (ref 23–31)
COLOR UR AUTO: YELLOW
CREAT SERPL-MCNC: 0.8 MG/DL (ref 0.55–1.02)
EOSINOPHIL # BLD AUTO: 0.1 X10(3)/MCL (ref 0–0.9)
EOSINOPHIL NFR BLD AUTO: 1.1 %
ERYTHROCYTE [DISTWIDTH] IN BLOOD BY AUTOMATED COUNT: 15 % (ref 11.5–17)
GFR SERPLBLD CREATININE-BSD FMLA CKD-EPI: >60 MLS/MIN/1.73/M2
GLOBULIN SER-MCNC: 3.4 GM/DL (ref 2.4–3.5)
GLUCOSE SERPL-MCNC: 181 MG/DL (ref 75–121)
GLUCOSE UR QL STRIP.AUTO: NEGATIVE MG/DL
HCT VFR BLD AUTO: 40.5 % (ref 37–47)
HGB BLD-MCNC: 13.5 GM/DL (ref 12–16)
IMM GRANULOCYTES # BLD AUTO: 0.03 X10(3)/MCL (ref 0–0.04)
IMM GRANULOCYTES NFR BLD AUTO: 0.3 %
KETONES UR QL STRIP.AUTO: NEGATIVE MG/DL
LEUKOCYTE ESTERASE UR QL STRIP.AUTO: NEGATIVE UNIT/L
LYMPHOCYTES # BLD AUTO: 3.02 X10(3)/MCL (ref 0.6–4.6)
LYMPHOCYTES NFR BLD AUTO: 32.2 %
MCH RBC QN AUTO: 28.8 PG (ref 27–31)
MCHC RBC AUTO-ENTMCNC: 33.3 MG/DL (ref 33–36)
MCV RBC AUTO: 86.5 FL (ref 80–94)
MONOCYTES # BLD AUTO: 1.19 X10(3)/MCL (ref 0.1–1.3)
MONOCYTES NFR BLD AUTO: 12.7 %
NEUTROPHILS # BLD AUTO: 4.9 X10(3)/MCL (ref 2.1–9.2)
NEUTROPHILS NFR BLD AUTO: 52.6 %
NITRITE UR QL STRIP.AUTO: NEGATIVE
PH UR STRIP.AUTO: 5.5 [PH]
PLATELET # BLD AUTO: 236 X10(3)/MCL (ref 130–400)
PMV BLD AUTO: 11 FL (ref 7.4–10.4)
POTASSIUM SERPL-SCNC: 3.3 MMOL/L (ref 3.5–5.1)
PROT SERPL-MCNC: 7.1 GM/DL (ref 5.8–7.6)
PROT UR QL STRIP.AUTO: NEGATIVE MG/DL
RBC # BLD AUTO: 4.68 X10(6)/MCL (ref 4.2–5.4)
RBC #/AREA URNS AUTO: ABNORMAL /HPF
RBC UR QL AUTO: ABNORMAL UNIT/L
SODIUM SERPL-SCNC: 139 MMOL/L (ref 132–146)
SP GR UR STRIP.AUTO: >=1.03
SQUAMOUS #/AREA URNS AUTO: ABNORMAL /HPF
UROBILINOGEN UR STRIP-ACNC: 0.2 MG/DL
WBC # SPEC AUTO: 9.4 X10(3)/MCL (ref 4.5–11.5)
WBC #/AREA URNS AUTO: ABNORMAL /HPF

## 2022-11-05 PROCEDURE — 80053 COMPREHEN METABOLIC PANEL: CPT | Performed by: NURSE PRACTITIONER

## 2022-11-05 PROCEDURE — 81001 URINALYSIS AUTO W/SCOPE: CPT | Performed by: NURSE PRACTITIONER

## 2022-11-05 PROCEDURE — 81003 URINALYSIS AUTO W/O SCOPE: CPT | Performed by: NURSE PRACTITIONER

## 2022-11-05 PROCEDURE — 99283 EMERGENCY DEPT VISIT LOW MDM: CPT | Mod: 25

## 2022-11-05 PROCEDURE — 85025 COMPLETE CBC W/AUTO DIFF WBC: CPT | Performed by: NURSE PRACTITIONER

## 2022-11-05 RX ORDER — ALENDRONATE SODIUM 35 MG/1
35 TABLET ORAL
COMMUNITY
Start: 2022-10-24

## 2022-11-05 RX ORDER — LIDOCAINE HYDROCHLORIDE 20 MG/ML
5 SOLUTION OROPHARYNGEAL
Status: DISCONTINUED | OUTPATIENT
Start: 2022-11-05 | End: 2022-11-05

## 2022-11-05 RX ORDER — BENAZEPRIL HYDROCHLORIDE 10 MG/1
10 TABLET ORAL DAILY
COMMUNITY
Start: 2022-09-27

## 2022-11-05 RX ORDER — AMLODIPINE BESYLATE 5 MG/1
5 TABLET ORAL DAILY
COMMUNITY
Start: 2022-10-10

## 2022-11-05 RX ORDER — PANTOPRAZOLE SODIUM 40 MG/1
40 TABLET, DELAYED RELEASE ORAL DAILY
COMMUNITY
Start: 2022-10-10

## 2022-11-05 RX ORDER — LEVOTHYROXINE SODIUM 25 UG/1
25 TABLET ORAL DAILY
COMMUNITY
Start: 2022-10-10

## 2022-11-05 RX ORDER — LIDOCAINE 50 MG/G
1 PATCH TOPICAL DAILY
Qty: 15 PATCH | Refills: 0 | Status: SHIPPED | OUTPATIENT
Start: 2022-11-05 | End: 2022-11-20

## 2022-11-05 NOTE — ED PROVIDER NOTES
Encounter Date: 11/5/2022       History     Chief Complaint   Patient presents with    Back Pain     C/o left sided back pain since yesterday. Pt's caregiver states that this often happens when she has a UTI but pt completed meds for a UTI this past Thursday.      The patient is a 89 year old female with history of dementia, diverticulosis, htn, hypothyroidism, nephrolithiasis, and recent recurrent UTI presents to the ER today for left sided flank pain.  Her caregiver provided her history, states that the patient very recently finished a round of doxycycline for UTI, states that when she has a UTI the symptoms present in the same fashion, left flank pain.  Denies any nausea, vomiting, sob, or chest pain.  Denies any fever.  Hypertensive in triage.  Culture report reviewed from 10/1 resistant to bactrim and quinolones.      Review of patient's allergies indicates:  No Known Allergies  Past Medical History:   Diagnosis Date    Dementia     Diverticulosis of colon     HH (hiatus hernia)     Hypertension     Hypothyroidism     Nephrolithiasis      Past Surgical History:   Procedure Laterality Date    BREAST BIOPSY      CATARACT EXTRACTION      TONSILLECTOMY       Family History   Problem Relation Age of Onset    Heart disease Mother     Heart disease Father      Social History     Tobacco Use    Smoking status: Never    Smokeless tobacco: Never   Substance Use Topics    Alcohol use: Never    Drug use: Never     Review of Systems   All other systems reviewed and are negative.    Physical Exam     Initial Vitals [11/05/22 1813]   BP Pulse Resp Temp SpO2   (!) 184/82 89 18 97 °F (36.1 °C) (!) 93 %      MAP       --         Physical Exam    Nursing note and vitals reviewed.  Constitutional: She appears well-developed and well-nourished. She is cooperative.   Ambulates with a walker   Eyes: Conjunctivae and EOM are normal.   Neck: Neck supple.   Normal range of motion.  Cardiovascular:  Normal rate and regular rhythm.            Pulmonary/Chest: Breath sounds normal.   Abdominal: Abdomen is soft. Bowel sounds are normal.   No tenderness, benign examination   Musculoskeletal:        Arms:       Cervical back: Normal range of motion and neck supple.      Comments: Severe pain to palpation left mid thoracic region, no palpable muscle spasm, no rash, consistent with shingles     Neurological: She is alert. She has normal strength.   Skin: Skin is warm and dry.       ED Course   Procedures  Labs Reviewed   URINALYSIS, REFLEX TO URINE CULTURE - Abnormal; Notable for the following components:       Result Value    Appearance, UA Cloudy (*)     Blood, UA Trace-Lysed (*)     All other components within normal limits   COMPREHENSIVE METABOLIC PANEL - Abnormal; Notable for the following components:    Potassium Level 3.3 (*)     Glucose Level 181 (*)     Alkaline Phosphatase 39 (*)     All other components within normal limits   CBC WITH DIFFERENTIAL - Abnormal; Notable for the following components:    MPV 11.0 (*)     All other components within normal limits   URINALYSIS, MICROSCOPIC - Abnormal; Notable for the following components:    Squamous Epithelial Cells, UA Few (*)     All other components within normal limits   CBC W/ AUTO DIFFERENTIAL    Narrative:     The following orders were created for panel order CBC auto differential.  Procedure                               Abnormality         Status                     ---------                               -----------         ------                     CBC with Differential[[id number]]        Abnormal            Final result                 Please view results for these tests on the individual orders.          Imaging Results    None          Medications - No data to display              ED Course as of 11/05/22 2005   Sat Nov 05, 2022 1953 Discussed case with Dr. Coe, he is personally examining the patient.  Her labs are essentially unremarkable, glucose is elevated at 181, potassium is  3.3, trace hematuria, WBC WNL, vital signs at her baseline, NAD [EB]   1957 Dr. Coe agrees with diagnosis of shingles, will treat with lildocaine patch [EB]      ED Course User Index  [EB] SUKH Tinsley                 Clinical Impression:   Final diagnoses:  [B02.9] Herpes zoster without complication (Primary)      ED Disposition Condition    Discharge Stable          ED Prescriptions       Medication Sig Dispense Start Date End Date Auth. Provider    LIDOcaine (LIDODERM) 5 % Place 1 patch onto the skin once daily. Remove & Discard patch within 12 hours or as directed by MD for 15 days 15 patch 11/5/2022 11/20/2022 SUKH Tinsley          Follow-up Information       Follow up With Specialties Details Why Contact Info    Patricia Villalta MD Family Medicine In 2 days  [address] [facility] [id number]  [phone]               SUKH Tinsley  11/05/22 2006

## 2022-11-10 DIAGNOSIS — R10.84 ABDOMINAL PAIN, GENERALIZED: Primary | ICD-10-CM

## 2022-11-16 ENCOUNTER — HOSPITAL ENCOUNTER (OUTPATIENT)
Dept: RADIOLOGY | Facility: HOSPITAL | Age: 87
Discharge: HOME | End: 2022-11-16
Attending: FAMILY MEDICINE
Payer: MEDICARE

## 2022-11-16 DIAGNOSIS — R10.84 ABDOMINAL PAIN, GENERALIZED: ICD-10-CM

## 2022-11-16 PROCEDURE — 25500020 PHARM REV CODE 255: Performed by: FAMILY MEDICINE

## 2022-11-16 PROCEDURE — 74177 CT ABD & PELVIS W/CONTRAST: CPT | Mod: TC

## 2022-11-16 RX ADMIN — IOPAMIDOL 100 ML: 755 INJECTION, SOLUTION INTRAVENOUS at 10:11

## 2022-11-16 RX ADMIN — DIATRIZOATE MEGLUMINE AND DIATRIZOATE SODIUM 30 ML: 660; 100 LIQUID ORAL; RECTAL at 10:11
